# Patient Record
Sex: MALE | Race: WHITE
[De-identification: names, ages, dates, MRNs, and addresses within clinical notes are randomized per-mention and may not be internally consistent; named-entity substitution may affect disease eponyms.]

---

## 2017-10-21 PROCEDURE — 87086 URINE CULTURE/COLONY COUNT: CPT | Performed by: FAMILY MEDICINE

## 2019-05-26 ENCOUNTER — TELEPHONE (OUTPATIENT)
Dept: SCHEDULING | Age: 54
End: 2019-05-26

## 2019-05-26 ENCOUNTER — WALK IN (OUTPATIENT)
Dept: URGENT CARE | Age: 54
End: 2019-05-26

## 2019-05-26 VITALS
BODY MASS INDEX: 30.8 KG/M2 | SYSTOLIC BLOOD PRESSURE: 128 MMHG | RESPIRATION RATE: 16 BRPM | TEMPERATURE: 97.9 F | HEIGHT: 71 IN | WEIGHT: 220 LBS | DIASTOLIC BLOOD PRESSURE: 76 MMHG | HEART RATE: 80 BPM

## 2019-05-26 DIAGNOSIS — L03.113 CELLULITIS OF RIGHT UPPER EXTREMITY: Primary | ICD-10-CM

## 2019-05-26 DIAGNOSIS — T63.301A SPIDER BITE WOUND, ACCIDENTAL OR UNINTENTIONAL, INITIAL ENCOUNTER: ICD-10-CM

## 2019-05-26 PROCEDURE — 99203 OFFICE O/P NEW LOW 30 MIN: CPT | Performed by: NURSE PRACTITIONER

## 2019-05-26 RX ORDER — AMOXICILLIN AND CLAVULANATE POTASSIUM 875; 125 MG/1; MG/1
1 TABLET, FILM COATED ORAL EVERY 12 HOURS
Qty: 20 TABLET | Refills: 0 | Status: SHIPPED | OUTPATIENT
Start: 2019-05-26 | End: 2019-06-05

## 2019-05-26 RX ORDER — MUPIROCIN CALCIUM 20 MG/G
CREAM TOPICAL 3 TIMES DAILY
Qty: 15 G | Refills: 0 | Status: SHIPPED | OUTPATIENT
Start: 2019-05-26 | End: 2019-05-26 | Stop reason: CLARIF

## 2019-05-26 ASSESSMENT — ENCOUNTER SYMPTOMS
WOUND: 0
RESPIRATORY NEGATIVE: 1
ROS SKIN COMMENTS: SEE HPI
FEVER: 0
SHORTNESS OF BREATH: 0
CONSTITUTIONAL NEGATIVE: 1
STRIDOR: 0
WHEEZING: 0

## 2021-04-10 DIAGNOSIS — Z23 NEED FOR VACCINATION: ICD-10-CM

## 2024-03-29 ENCOUNTER — OFFICE VISIT (OUTPATIENT)
Dept: FAMILY MEDICINE CLINIC | Facility: CLINIC | Age: 59
End: 2024-03-29
Payer: COMMERCIAL

## 2024-03-29 VITALS
SYSTOLIC BLOOD PRESSURE: 114 MMHG | HEIGHT: 72 IN | OXYGEN SATURATION: 99 % | HEART RATE: 81 BPM | RESPIRATION RATE: 16 BRPM | WEIGHT: 215 LBS | BODY MASS INDEX: 29.12 KG/M2 | TEMPERATURE: 98 F | DIASTOLIC BLOOD PRESSURE: 82 MMHG

## 2024-03-29 DIAGNOSIS — E78.5 HYPERLIPIDEMIA, UNSPECIFIED HYPERLIPIDEMIA TYPE: ICD-10-CM

## 2024-03-29 DIAGNOSIS — Z12.5 PROSTATE CANCER SCREENING: ICD-10-CM

## 2024-03-29 DIAGNOSIS — L65.9 HAIR THINNING: Primary | ICD-10-CM

## 2024-03-29 PROCEDURE — 99213 OFFICE O/P EST LOW 20 MIN: CPT | Performed by: FAMILY MEDICINE

## 2024-03-29 NOTE — PROGRESS NOTES
Ismael Escobar is a 58 year old male.   Chief Complaint   Patient presents with    Hair/Scalp Problem     Discuss hair thinnings, previous patient     HPI:    58-year-old male comes in to establish care.  States that he is going through divorce trying to avoid stress and noticed that his hair is thinning a bit.  He also states that his brother recently passed from a stroke..  Patient would like to know if there is any other testing that he needs to do to make sure that he does not have the same fetuses brother.  He is wondering if the thinning of his hair is a sign of a potential health problem.  Otherwise no other concerns  Past Medical History:   Diagnosis Date    Anal fissure      Past Surgical History:   Procedure Laterality Date    CHOLECYSTECTOMY  05/09    COLON[facility]COPY  11/18/11  [facility]    Blood in stool: int hemorrhoids, mild pan-diverticulosis; next colonoscopy in 10 yrs    COLONOSCOPY,DIAGNOSTIC  11/18/2011    Performed by FUAD BAUTISTA at [facility]    OTHER SURGICAL HISTORY  10 years ago    left shoulder scope    OTHER SURGICAL HISTORY  15 yrs ago    right acl repair    OTHER SURGICAL HISTORY  5/4/12    lateral sphincterotomy, anoscopy, bx of anal fissure, excision of anal tag by Dr. Horne @ [place]     Family History   Problem Relation Age of Onset    Heart Disorder Mother         irregular heart beat    Heart Disorder Brother         unknown    Diabetes Other         type 2     Social History:  Social History     Socioeconomic History    Marital status:    Tobacco Use    Smoking status: Never    Smokeless tobacco: Never   Vaping Use    Vaping Use: Every day   Substance and Sexual Activity    Alcohol use: Yes     Alcohol/week: 0.0 standard drinks of alcohol     Comment: once a week    Drug use: No     Allergies:  Allergies   Allergen Reactions    Animal Dander [Dander]     Grass     Mold     Pollen     Tree, Elm       Current Meds:  No current outpatient medications on file.         ROS:   GENERAL HEALTH: feels well otherwise  SKIN: denies any unusual skin lesions or rashes  RESPIRATORY: denies shortness of breath with exertion  CARDIOVASCULAR: denies chest pain on exertion  GI: denies abdominal pain and denies heartburn  NEURO: denies headaches    PHYSICAL EXAM:   /82   Pulse 81   Temp 97.7 °F (36.5 °C) (Temporal)   Resp 16   Ht 6' (1.829 m)   Wt 215 lb (97.5 kg)   SpO2 99%   BMI 29.16 kg/m²   GENERAL HEALTH: well developed, well nourished, in no apparent distress  EYES: sclera anicteric, conjunctiva normal  HEENT: normocephalic; normal pharynx  NECK: supple; no JVD, no LAD  RESPIRATORY: clear to auscultation bilaterally, no tachypnea  CARDIOVASCULAR: S1, S2 normal, no S3, no S4; no click; no murmur  EXTREMITIES: no cyanosis, clubbing or edema, peripheral pulses intact  PSYCHIATRIC: alert and oriented x 3; affect appropriate      ASSESSMENT/ PLAN:     Diagnoses and all orders for this visit:    Hair thinning  -     CBC W Differential W Platelet [E]; Future  -     Comp Metabolic Panel (14) [E]; Future  -     Ferritin [E]; Future  -     TSH and Free T4 [E]; Future    Hyperlipidemia, unspecified hyperlipidemia type  -     Lipid Panel [E]; Future    Prostate cancer screening    Discussed his cardiovascular risk, reviewed recent labs and testing. Reassured, ordered labs for future to assess hair and some preventaive care, due for cpx in May.    Hair thinning can be due to multiple causes including products, stress, etc, will offer labs to try to find a metabolic reason, may try rogaine otc as a trial if he wishes    The patient is to return to office in cpx in May  The patient is to return to office for persistent or worsening signs and symptoms.   The proper use of medication and possible side effects discussed with patient.  An AVS was given to patient.  The patient verbalized understanding, agrees to treatment regimen and all questions were answered.

## 2024-11-14 ENCOUNTER — TELEPHONE (OUTPATIENT)
Dept: FAMILY MEDICINE CLINIC | Facility: CLINIC | Age: 59
End: 2024-11-14

## 2024-11-14 ENCOUNTER — OFFICE VISIT (OUTPATIENT)
Dept: FAMILY MEDICINE CLINIC | Facility: CLINIC | Age: 59
End: 2024-11-14
Payer: COMMERCIAL

## 2024-11-14 VITALS
OXYGEN SATURATION: 98 % | TEMPERATURE: 98 F | RESPIRATION RATE: 16 BRPM | SYSTOLIC BLOOD PRESSURE: 132 MMHG | DIASTOLIC BLOOD PRESSURE: 87 MMHG | HEIGHT: 71 IN | HEART RATE: 73 BPM | BODY MASS INDEX: 30 KG/M2

## 2024-11-14 DIAGNOSIS — K62.89 RECTAL IRRITATION: ICD-10-CM

## 2024-11-14 DIAGNOSIS — Z11.3 SCREENING EXAMINATION FOR STI: Primary | ICD-10-CM

## 2024-11-14 PROCEDURE — 87491 CHLMYD TRACH DNA AMP PROBE: CPT | Performed by: NURSE PRACTITIONER

## 2024-11-14 PROCEDURE — 99213 OFFICE O/P EST LOW 20 MIN: CPT | Performed by: NURSE PRACTITIONER

## 2024-11-14 PROCEDURE — 87591 N.GONORRHOEAE DNA AMP PROB: CPT | Performed by: NURSE PRACTITIONER

## 2024-11-14 RX ORDER — CLOTRIMAZOLE 1 %
1 CREAM (GRAM) TOPICAL 2 TIMES DAILY
Qty: 30 G | Refills: 0 | Status: SHIPPED | OUTPATIENT
Start: 2024-11-14

## 2024-11-14 RX ORDER — HYDROCORTISONE 25 MG/G
1 CREAM TOPICAL 2 TIMES DAILY
Qty: 30 G | Refills: 0 | Status: SHIPPED | OUTPATIENT
Start: 2024-11-14

## 2024-11-14 NOTE — PROGRESS NOTES
CHIEF COMPLAINT:     Chief Complaint   Patient presents with    STD     No symptoms     Anal Problem     Itching, sticky moisture, bleeding from wiping x 1 month        HPI:   Ismael Escobar is a 59 year old male who presents with STI testing request.  Denies urinary symptoms.  Associated symptoms: none.   Denies abd pain, flank pain, fever, hematuria, nausea, or vomiting.  Denies penis discharge, itching, lesions, or rash.   Recently divorcee.  New sexual partner requesting testing.  1 recent episode of unprotected sexual intercourse.   Treatments tried: none.   Other  hx: none  No hx of STI       Ismael Escobar is a 59 year old male who presents with complaints of anal itching, sticky moisture, slight bleeding when he wipes.   Denies pain with stools or showers.  He has had symptoms for 1 month.   He works out and takes a shower following exercise and dries area thoroughly.   When he wipes the area, it stimulates itching and clear oozing.  His has a history of anal fissure and hemorrhoid surgery.   Current treatment: OTC creams, antibacterial ointment and baby wipes.       No current outpatient medications on file.      Past Medical History:    Anal fissure      Social History:  Social History     Socioeconomic History    Marital status:    Tobacco Use    Smoking status: Never    Smokeless tobacco: Never   Vaping Use    Vaping status: Every Day   Substance and Sexual Activity    Alcohol use: Yes     Alcohol/week: 0.0 standard drinks of alcohol     Comment: once a week    Drug use: No         REVIEW OF SYSTEMS:   GENERAL: Denies fever, chills, or body aches; good appetite  SKIN: no rashes  CARDIOVASCULAR: denies chest pain or palpitations  LUNGS: denies shortness of breath, cough, or wheezing  GI: See HPI. No N/V/C/D.   : See HPI.      EXAM:   /87   Pulse 73   Temp 97.9 °F (36.6 °C) (Temporal)   Resp 16   Ht 5' 11\" (1.803 m)   SpO2 98%   BMI 29.99 kg/m²   GENERAL: well developed, well  nourished,in no apparent distress  CARDIO: RRR, no murmurs  LUNGS: clear to ausculation bilaterally, no wheezing or rhonchi  GI: BS present x 4.  No hepatosplenomegaly, No tenderness  : No suprapubic tenderness; no bladder distention; No CVAT   ANAL area: perineum excoriated and erythematous,  excoriation to gluteal cleft, slight clear drainage on 2 X 2 guaze.    EXTREMITIES: no edema        ASSESSMENT AND PLAN:   Ismael Escobar is a 59 year old male presents with UTI symptoms.    ASSESSMENT:  Encounter Diagnoses   Name Primary?    Screening examination for STI Yes    Rectal irritation        PLAN:       1. Screening examination for STI  Reinforced condom use.  Referred patient to his PCP for additional testing.    Reviewed safe sex  Follow-up with PCP if any problems.     - Chlamydia/Gc Amplification; Future  - Chlamydia/Gc Amplification    2. Rectal irritation  Likely a fungal or yeast based skin infection.  Will try hydrocortisone 2.5% and clotrimazole to area.   Meds and instructions as listed below.  Skin care discussed with patient.   Risks, benefits, and side effects of medication explained and discussed.  Comfort measures as described in Patient Instructions.  Recommended that he make a follow-up appointment for his PCP  To f/u with PCP if no improvement in 3-5 days or sooner for new or worsening sx.    - hydrocortisone 2.5 % External Cream; Apply 1 Application topically 2 (two) times daily. Apply to affected area sparingly 2 times daily.  Dispense: 30 g; Refill: 0  - clotrimazole 1 % External Cream; Apply 1 Application topically 2 (two) times daily.  Dispense: 30 g; Refill: 0       Meds & Refills for this Visit:  Requested Prescriptions     Signed Prescriptions Disp Refills    hydrocortisone 2.5 % External Cream 30 g 0     Sig: Apply 1 Application topically 2 (two) times daily. Apply to affected area sparingly 2 times daily.    clotrimazole 1 % External Cream 30 g 0     Sig: Apply 1 Application topically  2 (two) times daily.         Patient Instructions   Hydrocortisone cream and clotrimazole as prescribed  Keep area clean and pat dry.        Recommend condom use  Avoid sexual contact until results are know.    Chlamydia and Gonorrhea sent to lab  Results will be on My Chart.    Make an appointment for follow-up and further STI testing with PCP.         The patient indicates understanding of these issues and agrees to the plan.

## 2024-11-14 NOTE — TELEPHONE ENCOUNTER
Left message on voicemail/answering machine for patient to call office to schedule appt with Dr Negrete to discuss

## 2024-11-14 NOTE — PATIENT INSTRUCTIONS
Hydrocortisone cream and clotrimazole as prescribed  Keep area clean and pat dry.        Recommend condom use  Avoid sexual contact until results are know.    Chlamydia and Gonorrhea sent to lab  Results will be on My Chart.    Make an appointment for follow-up and further STI testing with PCP.

## 2024-11-14 NOTE — TELEPHONE ENCOUNTER
Pt calling- Seen at [facility] today-  provided urine test for chlamydia. Pt requesting order for full panel STD check.     Pt states he is recently  and had unprotected intercourse with someone else.    Please notify pt once orders have been  placed.

## 2024-11-14 NOTE — TELEPHONE ENCOUNTER
Future Appointments   Date Time Provider Department Center   11/15/2024  7:30 AM Kong Delgado MD [facility] [place]

## 2024-11-15 ENCOUNTER — OFFICE VISIT (OUTPATIENT)
Dept: FAMILY MEDICINE CLINIC | Facility: CLINIC | Age: 59
End: 2024-11-15
Payer: COMMERCIAL

## 2024-11-15 VITALS
TEMPERATURE: 98 F | BODY MASS INDEX: 30 KG/M2 | DIASTOLIC BLOOD PRESSURE: 76 MMHG | HEIGHT: 71 IN | RESPIRATION RATE: 16 BRPM | HEART RATE: 77 BPM | OXYGEN SATURATION: 96 % | SYSTOLIC BLOOD PRESSURE: 138 MMHG

## 2024-11-15 DIAGNOSIS — Z11.3 SCREENING EXAMINATION FOR STI: Primary | ICD-10-CM

## 2024-11-15 DIAGNOSIS — L29.0 ANAL ITCH: ICD-10-CM

## 2024-11-15 LAB
C TRACH DNA SPEC QL NAA+PROBE: NEGATIVE
N GONORRHOEA DNA SPEC QL NAA+PROBE: NEGATIVE

## 2024-11-15 PROCEDURE — 99213 OFFICE O/P EST LOW 20 MIN: CPT | Performed by: FAMILY MEDICINE

## 2024-11-15 NOTE — PROGRESS NOTES
Ismael Escobar is a 59 year old male.   Chief Complaint   Patient presents with    Other     Experiencing oozing or secretions from skin in butt crack area for 2 months now.     Itching     Rectum and butt crack itches.for 2 months     STD     Full blood work for STD testing     HPI:    Pt  comes in for sti testing, newly  and dated a couple of women and wants testing for POM. Had chlam and gonorrhea testing yesterday on the outside and awaiting results. No skin findings or discharge.    Pt has anal irriatiaon, got creams yesterday and yet to start them  Past Medical History:    Anal fissure     Past Surgical History:   Procedure Laterality Date    Cholecystectomy  05/09    Colonoscopy  11/18/11  [facility]    Blood in stool: int hemorrhoids, mild pan-diverticulosis; next colonoscopy in 10 yrs    Colonoscopy,diagnostic  11/18/2011    Performed by FUAD BAUTISTA at [facility]    Other surgical history  10 years ago    left shoulder scope    Other surgical history  15 yrs ago    right acl repair    Other surgical history  5/4/12    lateral sphincterotomy, anoscopy, bx of anal fissure, excision of anal tag by Dr. Horne @ [place]     Family History   Problem Relation Age of Onset    Heart Disorder Mother         irregular heart beat    Heart Disorder Brother         unknown    Diabetes Other         type 2     Social History:  Social History     Socioeconomic History    Marital status:    Tobacco Use    Smoking status: Never    Smokeless tobacco: Never   Vaping Use    Vaping status: Never Used   Substance and Sexual Activity    Alcohol use: Yes     Alcohol/week: 0.0 standard drinks of alcohol     Comment: once a week    Drug use: No     Allergies:  Allergies[1]   Current Meds:  Current Outpatient Medications   Medication Sig Dispense Refill    hydrocortisone 2.5 % External Cream Apply 1 Application topically 2 (two) times daily. Apply to affected area sparingly 2 times daily. 30 g 0     clotrimazole 1 % External Cream Apply 1 Application topically 2 (two) times daily. 30 g 0        ROS:   GENERAL HEALTH: feels well otherwise  SKIN: denies any unusual skin lesions or rashes  RESPIRATORY: denies shortness of breath with exertion  CARDIOVASCULAR: denies chest pain on exertion  GI: denies abdominal pain and denies heartburn  NEURO: denies headaches    PHYSICAL EXAM:   /76 (BP Location: Left arm, Patient Position: Sitting, Cuff Size: adult)   Pulse 77   Temp 97.5 °F (36.4 °C)   Resp 16   Ht 5' 11\" (1.803 m)   SpO2 96%   BMI 29.99 kg/m²   GENERAL HEALTH: well developed, well nourished, in no apparent distress  EYES: sclera anicteric, conjunctiva normal  HEENT: normocephalic; normal pharynx  NECK: supple; no JVD, no LAD  RESPIRATORY: clear to auscultation bilaterally, no tachypnea  CARDIOVASCULAR: S1, S2 normal, no S3, no S4; no click; no murmur  EXTREMITIES: no cyanosis, clubbing or edema, peripheral pulses intact  PSYCHIATRIC: alert and oriented x 3; affect appropriate      ASSESSMENT/ PLAN:     Diagnoses and all orders for this visit:    Screening examination for STI  -     Cancel: HIV AG AB Combo [E]; Future  -     Cancel: HCV ANTIBODY [8472] [Q]; Future  -     Cancel: T Pallidum Screening [place]ie [E]; Future  -     HCV ANTIBODY [8472] [Q]; Future  -     HIV AG AB Combo [E]; Future  -     T Pallidum Screening [place] [E]; Future    Anal itch    Testing as above  Start creams anally and if no resolution in 2 weeks let us know to refer to surgery    The patient is to return to office in prn  The patient is to return to office for persistent or worsening signs and symptoms.   The proper use of medication and possible side effects discussed with patient.  An AVS was given to patient.  The patient verbalized understanding, agrees to treatment regimen and all questions were answered.        [1]   Allergies  Allergen Reactions    Animal Dander [Dander]     Grass     Mold     Pollen     Tree, Elm

## 2024-11-16 LAB — T. PALLIDUM AB, EIA: NEGATIVE

## 2025-04-14 ENCOUNTER — OFFICE VISIT (OUTPATIENT)
Dept: FAMILY MEDICINE CLINIC | Facility: CLINIC | Age: 60
End: 2025-04-14
Payer: COMMERCIAL

## 2025-04-14 VITALS
WEIGHT: 224.25 LBS | BODY MASS INDEX: 31 KG/M2 | HEART RATE: 75 BPM | OXYGEN SATURATION: 99 % | RESPIRATION RATE: 16 BRPM | SYSTOLIC BLOOD PRESSURE: 126 MMHG | TEMPERATURE: 97 F | DIASTOLIC BLOOD PRESSURE: 82 MMHG

## 2025-04-14 DIAGNOSIS — T75.3XXA MOTION SICKNESS, INITIAL ENCOUNTER: ICD-10-CM

## 2025-04-14 DIAGNOSIS — H02.841 SWELLING OF RIGHT UPPER EYELID: Primary | ICD-10-CM

## 2025-04-14 PROCEDURE — 99213 OFFICE O/P EST LOW 20 MIN: CPT | Performed by: FAMILY MEDICINE

## 2025-04-14 RX ORDER — PREDNISONE 20 MG/1
50 TABLET ORAL DAILY
Qty: 13 TABLET | Refills: 0 | Status: SHIPPED | OUTPATIENT
Start: 2025-04-14 | End: 2025-04-19

## 2025-04-14 RX ORDER — SCOPOLAMINE 1 MG/3D
1 PATCH, EXTENDED RELEASE TRANSDERMAL
Qty: 3 PATCH | Refills: 0 | Status: SHIPPED | OUTPATIENT
Start: 2025-04-14

## 2025-04-14 RX ORDER — TOBRAMYCIN AND DEXAMETHASONE 3; 1 MG/ML; MG/ML
1 SUSPENSION/ DROPS OPHTHALMIC
Qty: 5 ML | Refills: 0 | Status: CANCELLED | OUTPATIENT
Start: 2025-04-14

## 2025-04-14 RX ORDER — MECLIZINE HYDROCHLORIDE 25 MG/1
25 TABLET ORAL 3 TIMES DAILY PRN
Qty: 20 TABLET | Refills: 0 | Status: SHIPPED | OUTPATIENT
Start: 2025-04-14

## 2025-04-14 NOTE — PROGRESS NOTES
Ismael Escobar is a 59 year old male.   Chief Complaint   Patient presents with    Eye Problem     Bilateral eye swelling x 2 weeks     HPI:    59-year-old male comes in secondary to having swelling upper eyelids bilaterally.  This been going on since he started playing with dogs.  Patient states that he has an allergy to dog for.  This started about a week ago.  Patient tried putting special ointments that happened help.  Patient states that his vision is not altered by the and his eyes are not in pain.    Patient also states that he will be going on a cruise and would like to have medication to prevent motion sickness.  Past Medical History[1]  Past Surgical History[2]  Family History[3]  Social History:  Short Social Hx on File[4]  Allergies:  Allergies[5]   Current Meds:  Current Medications[6]     ROS:   GENERAL HEALTH: feels well otherwise  SKIN: See HPI  RESPIRATORY: denies shortness of breath with exertion  CARDIOVASCULAR: denies chest pain on exertion  GI: denies abdominal pain and denies heartburn  NEURO: denies headaches    PHYSICAL EXAM:   /82   Pulse 75   Temp 97.1 °F (36.2 °C) (Temporal)   Resp 16   Wt 224 lb 4 oz (101.7 kg)   SpO2 99%   BMI 31.28 kg/m²   GENERAL HEALTH: well developed, well nourished, in no apparent distress  EYES: sclera anicteric, conjunctiva normal, bilateral edema upper eyelids noted mild erythema.  HEENT: normocephalic; normal pharynx  NECK: supple; no JVD, no LAD  RESPIRATORY: clear to auscultation bilaterally, no tachypnea  CARDIOVASCULAR: S1, S2 normal, no S3, no S4; no click; no murmur  EXTREMITIES: no cyanosis, clubbing or edema, peripheral pulses intact  PSYCHIATRIC: alert and oriented x 3; affect appropriate      ASSESSMENT/ PLAN:     Diagnoses and all orders for this visit:    Swelling of right upper eyelid  -     predniSONE 20 MG Oral Tab; Take 2.5 tablets (50 mg total) by mouth daily for 5 days.    Motion sickness, initial encounter  -     Scopolamine 1.5mg  TD patch 1mg/3days; Place 1 patch onto the skin every third day.  -     meclizine 25 MG Oral Tab; Take 1 tablet (25 mg total) by mouth 3 (three) times daily as needed.    Seems to be reactive due to allergen.  When put on prednisone oral 50 mg daily for the next 5 days.  If no significant improvement to let us know    Will give a trial of scopolamine patch to wear 1 day prior to getting on the boat can switch every third day.  Also given a prescription for meclizine should he still get sick while he is on the trip.    The patient is to return to office in prn  The patient is to return to office for persistent or worsening signs and symptoms.   The proper use of medication and possible side effects discussed with patient.  An AVS was given to patient.  The patient verbalized understanding, agrees to treatment regimen and all questions were answered.          [1]   Past Medical History:   Anal fissure   [2]   Past Surgical History:  Procedure Laterality Date    Cholecystectomy  05/09    Colonoscopy  11/18/11  [facility]    Blood in stool: int hemorrhoids, mild pan-diverticulosis; next colonoscopy in 10 yrs    Colonoscopy,diagnostic  11/18/2011    Performed by FUAD BAUTISTA at [facility], [facility]    Other surgical history  10 years ago    left shoulder scope    Other surgical history  15 yrs ago    right acl repair    Other surgical history  5/4/12    lateral sphincterotomy, anoscopy, bx of anal fissure, excision of anal tag by Dr. Horne @ [place]   [3]   Family History  Problem Relation Age of Onset    Heart Disorder Mother         irregular heart beat    Heart Disorder Brother         unknown    Diabetes Other         type 2   [4]   Social History  Socioeconomic History    Marital status:    Tobacco Use    Smoking status: Never    Smokeless tobacco: Never   Vaping Use    Vaping status: Never Used   Substance and Sexual Activity    Alcohol use: Yes     Alcohol/week: 0.0 standard drinks of alcohol      Comment: once a week    Drug use: No   [5]   Allergies  Allergen Reactions    Animal Dander [Dander]     Grass     Mold     Pollen     Tree, Elm    [6]   Current Outpatient Medications   Medication Sig Dispense Refill    predniSONE 20 MG Oral Tab Take 2.5 tablets (50 mg total) by mouth daily for 5 days. 13 tablet 0    Scopolamine 1.5mg TD patch 1mg/3days Place 1 patch onto the skin every third day. 3 patch 0    meclizine 25 MG Oral Tab Take 1 tablet (25 mg total) by mouth 3 (three) times daily as needed. 20 tablet 0    hydrocortisone 2.5 % External Cream Apply 1 Application topically 2 (two) times daily. Apply to affected area sparingly 2 times daily. (Patient not taking: Reported on 4/14/2025) 30 g 0    clotrimazole 1 % External Cream Apply 1 Application topically 2 (two) times daily. (Patient not taking: Reported on 4/14/2025) 30 g 0

## 2025-05-15 ENCOUNTER — PATIENT MESSAGE (OUTPATIENT)
Dept: FAMILY MEDICINE CLINIC | Facility: CLINIC | Age: 60
End: 2025-05-15

## 2025-05-19 RX ORDER — TADALAFIL 20 MG/1
20 TABLET ORAL AS NEEDED
Qty: 24 TABLET | Refills: 3 | Status: SHIPPED | OUTPATIENT
Start: 2025-05-19